# Patient Record
Sex: MALE | Race: WHITE
[De-identification: names, ages, dates, MRNs, and addresses within clinical notes are randomized per-mention and may not be internally consistent; named-entity substitution may affect disease eponyms.]

---

## 2018-05-21 ENCOUNTER — HOSPITAL ENCOUNTER (EMERGENCY)
Dept: HOSPITAL 25 - ED | Age: 48
Discharge: HOME | End: 2018-05-21
Payer: COMMERCIAL

## 2018-05-21 VITALS — DIASTOLIC BLOOD PRESSURE: 83 MMHG | SYSTOLIC BLOOD PRESSURE: 138 MMHG

## 2018-05-21 DIAGNOSIS — I47.1: Primary | ICD-10-CM

## 2018-05-21 DIAGNOSIS — F17.200: ICD-10-CM

## 2018-05-21 DIAGNOSIS — Z88.0: ICD-10-CM

## 2018-05-21 LAB
BASOPHILS # BLD AUTO: 0 10^3/UL (ref 0–0.2)
EOSINOPHIL # BLD AUTO: 0.1 10^3/UL (ref 0–0.6)
HCT VFR BLD AUTO: 41 % (ref 42–52)
HGB BLD-MCNC: 14.6 G/DL (ref 14–18)
LYMPHOCYTES # BLD AUTO: 1.7 10^3/UL (ref 1–4.8)
MCH RBC QN AUTO: 36 PG (ref 27–31)
MCHC RBC AUTO-ENTMCNC: 35 G/DL (ref 31–36)
MCV RBC AUTO: 101 FL (ref 80–94)
MONOCYTES # BLD AUTO: 0.6 10^3/UL (ref 0–0.8)
NEUTROPHILS # BLD AUTO: 2.7 10^3/UL (ref 1.5–7.7)
NRBC # BLD AUTO: 0 10^3/UL
NRBC BLD QL AUTO: 0.1
PLATELET # BLD AUTO: 144 10^3/UL (ref 150–450)
RBC # BLD AUTO: 4.1 10^6/UL (ref 4–5.4)
WBC # BLD AUTO: 5.1 10^3/UL (ref 3.5–10.8)

## 2018-05-21 PROCEDURE — 36415 COLL VENOUS BLD VENIPUNCTURE: CPT

## 2018-05-21 PROCEDURE — 84443 ASSAY THYROID STIM HORMONE: CPT

## 2018-05-21 PROCEDURE — 83605 ASSAY OF LACTIC ACID: CPT

## 2018-05-21 PROCEDURE — 83735 ASSAY OF MAGNESIUM: CPT

## 2018-05-21 PROCEDURE — 85025 COMPLETE CBC W/AUTO DIFF WBC: CPT

## 2018-05-21 PROCEDURE — 93005 ELECTROCARDIOGRAM TRACING: CPT

## 2018-05-21 PROCEDURE — 83880 ASSAY OF NATRIURETIC PEPTIDE: CPT

## 2018-05-21 PROCEDURE — 82550 ASSAY OF CK (CPK): CPT

## 2018-05-21 PROCEDURE — 99284 EMERGENCY DEPT VISIT MOD MDM: CPT

## 2018-05-21 PROCEDURE — 84484 ASSAY OF TROPONIN QUANT: CPT

## 2018-05-21 PROCEDURE — 80053 COMPREHEN METABOLIC PANEL: CPT

## 2018-05-21 PROCEDURE — 82553 CREATINE MB FRACTION: CPT

## 2018-05-21 PROCEDURE — 71045 X-RAY EXAM CHEST 1 VIEW: CPT

## 2018-05-21 NOTE — ED
Elly MAHARAJ Gabriel, scribed for Travon Chavez MD on 05/21/18 at 1609 .





Palpitations / Dysrhythmia





- HPI Summary


HPI Summary: 





This patient is [facility] 47 [facility] Pt experienced 

palpitations an hour ago and had [facility]. There it 

was discovered he was in SVT and was given adenosine by EMS which reset SVT.  

The patient rates the pain 0/10 in severity. Patient reports feeling dizzy. Pt 

has no complaints currently. Pt states he does not abuse caffeine or drugs.   





- History of Current Complaint


Chief Complaint: EDChestWallPain


Time Seen by Provider: 05/21/18 15:57


Hx Obtained From: Patient


Onset/Duration: Resolved


Timing: Constant


Severity Initially: Severe


Severity Currently: None


Character: Fast, Pounding


Alleviating: Other - meds


Associated Signs & Symptoms: Dizzy





- Allergy/Home Medications


Allergies/Adverse Reactions: 


 Allergies











Allergy/AdvReac Type Severity Reaction Status Date / Time


 


Penicillins Allergy  Hives Verified 05/21/18 16:01











Home Medications: 


 Home Medications





Lisinopril TAB* [Prinivil TAB*] 20 mg PO DAILY 05/21/18 [History Confirmed 05/21 /18]


Pantoprazole TAB (NF) [Protonix TAB (NF)] 40 mg PO DAILY 05/21/18 [History 

Confirmed 05/21/18]


Simvastatin (NF) [Zocor (NF)] 40 mg PO DAILY 05/21/18 [History Confirmed 05/21/ 18]











PMH/Surg Hx/FS Hx/Imm Hx


Cardiovascular History: Reports: Hx Supraventricular Ventricular Tachycardia


   Denies: Hx Auto Implanted Cardiovert Defib, Hx Cardiac Arrest, Hx Congestive 

Heart Failure


Respiratory History: 


   Denies: Hx Bronchopulmonary Dysplasia, Hx Chronic Obstructive Pulmonary 

Disease (COPD), Hx Lung Cancer, Hx Pleural Effusion


GI History: 


   Denies: Hx Crohn's Disease, Hx Gall Bladder Disease


 History: 


   Denies: Hx Benign Prostatic Hyperplasia


Infectious Disease History: No


Infectious Disease History: 


   Denies: Traveled Outside the [place] in Last 30 Days





- Family History


Known Family History: Positive: Hypertension, Other - HLD 


   Negative: Renal Disease, Respiratory Disease





- Social History


Alcohol Use: Occasionally


Substance Use Type: Reports: None


Smoking Status (MU): Current Every Day Smoker





Review of Systems


Positive: Palpitations


Neurological: Other - dizzy 


All Other Systems Reviewed And Are Negative: Yes





Physical Exam





- Summary


Physical Exam Summary: 





VITAL SIGNS: Reviewed.


GENERAL:  Patient is a well-developed and nourished male who is lying 

comfortable in the stretcher.  Patient is not in any acute respiratory distress.


HEAD AND FACE: No signs of trauma.  No ecchymosis, hematomas or skull 

depressions. No sinus tenderness.


EYES: PERRLA, EOMI x 2, No injected conjunctiva, no nystagmus.


EARS: Hearing grossly intact. Ear canals and tympanic membranes are within 

normal limits.


MOUTH: Oropharynx within normal limits.


NECK: Supple, trachea is midline, no adenopathy, no JVD, no carotid bruit, no c-

spine tenderness, neck with full ROM.


CHEST: Symmetric, no tenderness at palpation


LUNGS: Clear to auscultation bilaterally. No wheezing or crackles.


CVS: Regular rate and rhythm, S1 and S2 present, no murmurs or gallops 

appreciated.


ABDOMEN: Soft, non-tender. No signs of distention. No rebound no guarding, and 

no masses palpated. Bowel sounds are normal.


EXTREMITIES: FROM in all major joints, no edema, no cyanosis or clubbing.


NEURO: Alert and oriented x 3. No acute neurological deficits. Speech is normal 

and follows commands.


SKIN: Dry and warm


 





Triage Information Reviewed: Yes


Vital Signs On Initial Exam: 


 Initial Vitals











Pulse Resp Pulse Ox


 


 89   21   95 


 


 05/21/18 15:33  05/21/18 15:33  05/21/18 15:33











Vital Signs Reviewed: Yes





Diagnostics





- Vital Signs


 Vital Signs











  Temp Pulse Resp BP Pulse Ox


 


 05/21/18 15:39  98.1 F  84  16  127/88  99


 


 05/21/18 15:34   88  23  127/88  95


 


 05/21/18 15:33   89  21   95














- Laboratory


Lab Results: 


 Lab Results











  05/21/18 05/21/18 05/21/18 Range/Units





  16:09 16:09 16:09 


 


WBC  5.1    (3.5-10.8)  10^3/ul


 


RBC  4.10    (4.0-5.4)  10^6/ul


 


Hgb  14.6    (14.0-18.0)  g/dl


 


Hct  41 L    (42-52)  %


 


MCV  101 H    (80-94)  fL


 


MCH  36 H    (27-31)  pg


 


MCHC  35    (31-36)  g/dl


 


RDW  13    (10.5-15)  %


 


Plt Count  144 L    (150-450)  10^3/ul


 


MPV  8.8    (7.4-10.4)  um3


 


Neut % (Auto)  53.0    (38-83)  %


 


Lymph % (Auto)  33.7    (25-47)  %


 


Mono % (Auto)  11.4 H    (0-7)  %


 


Eos % (Auto)  1.4    (0-6)  %


 


Baso % (Auto)  0.5    (0-2)  %


 


Absolute Neuts (auto)  2.7    (1.5-7.7)  10^3/ul


 


Absolute Lymphs (auto)  1.7    (1.0-4.8)  10^3/ul


 


Absolute Monos (auto)  0.6    (0-0.8)  10^3/ul


 


Absolute Eos (auto)  0.1    (0-0.6)  10^3/ul


 


Absolute Basos (auto)  0    (0-0.2)  10^3/ul


 


Absolute Nucleated RBC  0    10^3/ul


 


Nucleated RBC %  0.1    


 


Sodium   140   (139-145)  mmol/L


 


Potassium   3.7   (3.5-5.0)  mmol/L


 


Chloride   107   (101-111)  mmol/L


 


Carbon Dioxide   25   (22-32)  mmol/L


 


Anion Gap   8   (2-11)  mmol/L


 


BUN   12   (6-24)  mg/dL


 


Creatinine   0.83   (0.67-1.17)  mg/dL


 


Est GFR ( Amer)   127.7   (>60)  


 


Est GFR (Non-Af Amer)   99.3   (>60)  


 


BUN/Creatinine Ratio   14.5   (8-20)  


 


Glucose   105 H   ()  mg/dL


 


Lactic Acid    1.5  (0.5-2.0)  mmol/L


 


Calcium   8.9   (8.6-10.3)  mg/dL


 


Magnesium   2.0   (1.9-2.7)  mg/dL


 


Total Bilirubin   0.80   (0.2-1.0)  mg/dL


 


AST   25   (13-39)  U/L


 


ALT   30   (7-52)  U/L


 


Alkaline Phosphatase   39   ()  U/L


 


Total Creatine Kinase   76   ()  U/L


 


CK-MB (CK-2)   2.1   (0.6-6.3)  ng/mL


 


Troponin I   0.00   (<0.04)  ng/mL


 


B-Natriuretic Peptide    ( - 100) pg/mL


 


Total Protein   6.2 L   (6.4-8.9)  g/dL


 


Albumin   4.0   (3.2-5.2)  g/dL


 


Globulin   2.2   (2-4)  g/dL


 


Albumin/Globulin Ratio   1.8   (1-3)  


 


TSH   1.16   (0.34-5.60)  mcIU/mL














  05/21/18 Range/Units





  16:09 


 


WBC   (3.5-10.8)  10^3/ul


 


RBC   (4.0-5.4)  10^6/ul


 


Hgb   (14.0-18.0)  g/dl


 


Hct   (42-52)  %


 


MCV   (80-94)  fL


 


MCH   (27-31)  pg


 


MCHC   (31-36)  g/dl


 


RDW   (10.5-15)  %


 


Plt Count   (150-450)  10^3/ul


 


MPV   (7.4-10.4)  um3


 


Neut % (Auto)   (38-83)  %


 


Lymph % (Auto)   (25-47)  %


 


Mono % (Auto)   (0-7)  %


 


Eos % (Auto)   (0-6)  %


 


Baso % (Auto)   (0-2)  %


 


Absolute Neuts (auto)   (1.5-7.7)  10^3/ul


 


Absolute Lymphs (auto)   (1.0-4.8)  10^3/ul


 


Absolute Monos (auto)   (0-0.8)  10^3/ul


 


Absolute Eos (auto)   (0-0.6)  10^3/ul


 


Absolute Basos (auto)   (0-0.2)  10^3/ul


 


Absolute Nucleated RBC   10^3/ul


 


Nucleated RBC %   


 


Sodium   (139-145)  mmol/L


 


Potassium   (3.5-5.0)  mmol/L


 


Chloride   (101-111)  mmol/L


 


Carbon Dioxide   (22-32)  mmol/L


 


Anion Gap   (2-11)  mmol/L


 


BUN   (6-24)  mg/dL


 


Creatinine   (0.67-1.17)  mg/dL


 


Est GFR ( Amer)   (>60)  


 


Est GFR (Non-Af Amer)   (>60)  


 


BUN/Creatinine Ratio   (8-20)  


 


Glucose   ()  mg/dL


 


Lactic Acid   (0.5-2.0)  mmol/L


 


Calcium   (8.6-10.3)  mg/dL


 


Magnesium   (1.9-2.7)  mg/dL


 


Total Bilirubin   (0.2-1.0)  mg/dL


 


AST   (13-39)  U/L


 


ALT   (7-52)  U/L


 


Alkaline Phosphatase   ()  U/L


 


Total Creatine Kinase   ()  U/L


 


CK-MB (CK-2)   (0.6-6.3)  ng/mL


 


Troponin I   (<0.04)  ng/mL


 


B-Natriuretic Peptide  14 ( - 100) pg/mL


 


Total Protein   (6.4-8.9)  g/dL


 


Albumin   (3.2-5.2)  g/dL


 


Globulin   (2-4)  g/dL


 


Albumin/Globulin Ratio   (1-3)  


 


TSH   (0.34-5.60)  mcIU/mL











Result Diagrams: 


 05/21/18 16:09





 05/21/18 16:09


Lab Statement: Any lab studies that have been ordered have been reviewed, and 

results considered in the medical decision making process.





- Radiology


  ** CXR


Xray Interpretation: No Acute Changes - No evidence for acute intrathoracic 

disease. Dr. Chavez has reviewed this report.


Radiology Interpretation Completed By: Radiologist





- EKG


  ** 16:14


Cardiac Rate: NL


EKG Rhythm: Sinus Rhythm - at 86 BPM


EKG Interpretation: No ST elevations 





Course/Dx





- Course


Assessment/Plan: 47-year-old male who presents to the emergency department with 

a chief complaint of palpitations and dizziness.  As per  EMS report  the 

patient was in an SVT rhythm and he was given adenosine 6 mg.  The patient 

converted to a normal sinus rhythm and since then the patient has been in a 

normal rhythm.  Patient denies any chest pain or palpitations right now, denies 

any dizziness or shortness of breath.  This resolved without any significant 

abnormality.  EKG shows a normal sinus rhythm with no ST elevations.  Chest x-

ray shows no evidence of acute intrathoracic disease.  Discussed the case with 

Dr. Estrella from cardiology and he will transfer the patient to be discharged 

home with a prescription for atenolol 25 mg Once a day.  I discussed all the 

findings and test results with the patient. Patient was instructed to return to 

the emergency room immediately if any of the symptoms return or worsens. Plan 

of care was discussed with the patient and understands and agrees. All 

questions were answered at patient satisfaction.  There were no further 

complaints or concerns.  Lung exam before discharge: CTA B/L. Good air 

exchange. No wheezing or crackles heard. CVS: S1 and S2 present. No murmurs 

appreciated. Patient is alert and oriented x 3. Patient is hemodynamically 

stable. Patient will be discharged home with follow up PCP in the next 2-3 day





- Diagnoses


Provider Diagnoses: 


 SVT (supraventricular tachycardia)








Discharge





- Sign-Out/Discharge


Documenting (check all that apply): Discharge/Admit/Transfer





- Discharge Plan


Condition: Stable


Disposition: HOME


Prescriptions: 


Atenolol TAB* [Tenormin TAB* 25 MG] 25 mg PO DAILY #30 tab


Patient Education Materials:  Supraventricular Tachycardia (ED)


Referrals: 


Alejandro Hoover DO [Medical Doctor] - 


Additional Instructions: 


Follow up with Dr. Hoover, cardiology, in one week.





RETURN TO THE EMERGENCY DEPARTMENT IF ANY NEW OR WORSENING SYMPTOMS. 





The documentation as recorded by the Elly vivas Gabriel accurately reflects 

the service I personally performed and the decisions made by me, Travon Chavez MD.

## 2018-05-21 NOTE — RAD
Indication: Chest pain.



Comparison: No relevant prior exams available on the [facility] PACS for comparison.



Technique: Upright AP 1644 hours



Report: Clear lungs and pleural spaces. Negative for pneumothorax. The heart, pulmonary

vasculature, and mediastinal contours are unremarkable. Unremarkable osseous structures

and soft tissue contours.



IMPRESSION: No evidence for acute intrathoracic disease.